# Patient Record
Sex: MALE
[De-identification: names, ages, dates, MRNs, and addresses within clinical notes are randomized per-mention and may not be internally consistent; named-entity substitution may affect disease eponyms.]

---

## 2019-04-10 PROBLEM — Z00.00 ENCOUNTER FOR PREVENTIVE HEALTH EXAMINATION: Status: ACTIVE | Noted: 2019-04-10

## 2019-04-11 ENCOUNTER — APPOINTMENT (OUTPATIENT)
Dept: CARDIOLOGY | Facility: CLINIC | Age: 54
End: 2019-04-11
Payer: COMMERCIAL

## 2019-04-11 VITALS
BODY MASS INDEX: 27.31 KG/M2 | RESPIRATION RATE: 15 BRPM | WEIGHT: 160 LBS | SYSTOLIC BLOOD PRESSURE: 135 MMHG | HEART RATE: 68 BPM | DIASTOLIC BLOOD PRESSURE: 91 MMHG | HEIGHT: 64 IN

## 2019-04-11 PROCEDURE — 99242 OFF/OP CONSLTJ NEW/EST SF 20: CPT

## 2019-08-02 ENCOUNTER — APPOINTMENT (OUTPATIENT)
Dept: CARDIOLOGY | Facility: CLINIC | Age: 54
End: 2019-08-02
Payer: COMMERCIAL

## 2019-08-02 VITALS
DIASTOLIC BLOOD PRESSURE: 83 MMHG | BODY MASS INDEX: 27.31 KG/M2 | HEIGHT: 64 IN | WEIGHT: 160 LBS | HEART RATE: 82 BPM | RESPIRATION RATE: 15 BRPM | SYSTOLIC BLOOD PRESSURE: 126 MMHG

## 2019-08-02 PROCEDURE — ZZZZZ: CPT

## 2019-08-02 PROCEDURE — 93306 TTE W/DOPPLER COMPLETE: CPT

## 2019-08-02 PROCEDURE — 99213 OFFICE O/P EST LOW 20 MIN: CPT | Mod: 25

## 2019-08-02 PROCEDURE — 93015 CV STRESS TEST SUPVJ I&R: CPT

## 2021-02-28 ENCOUNTER — TRANSCRIPTION ENCOUNTER (OUTPATIENT)
Age: 56
End: 2021-02-28

## 2022-08-04 ENCOUNTER — APPOINTMENT (OUTPATIENT)
Dept: CARDIOLOGY | Facility: CLINIC | Age: 57
End: 2022-08-04

## 2022-08-04 VITALS
WEIGHT: 156 LBS | RESPIRATION RATE: 15 BRPM | HEART RATE: 80 BPM | SYSTOLIC BLOOD PRESSURE: 156 MMHG | HEIGHT: 64 IN | BODY MASS INDEX: 26.63 KG/M2 | DIASTOLIC BLOOD PRESSURE: 90 MMHG

## 2022-08-04 PROCEDURE — 93000 ELECTROCARDIOGRAM COMPLETE: CPT

## 2022-08-04 PROCEDURE — 99203 OFFICE O/P NEW LOW 30 MIN: CPT | Mod: 25

## 2022-08-04 NOTE — REASON FOR VISIT
[Follow-Up - Clinic] : a clinic follow-up of [Dyspnea] : dyspnea [CV Risk Factors and Non-Cardiac Disease] : CV risk factors and non-cardiac disease [Hypertension] : hypertension [FreeTextEntry1] : murmur

## 2022-08-04 NOTE — DISCUSSION/SUMMARY
[FreeTextEntry1] : This is a 57-year-old male with past medical history significant for hypertension who comes in for cardiac follow up evaluation.  He denies chest pain, shortness of breath, dizziness or syncope.  He does get occasional dyspnea on exertion. \par The patient has not been seen in over 2 years.  He ran out of his Micardis hydrochlorothiazide 80/25 mg/day.  He has been taking his wife's dose of 80 mg of Micardis per day.  His blood pressure still elevated.\par Electrocardiogram done August 4, 2022 demonstrated normal sinus rhythm rate of 67 bpm is otherwise unremarkable.\par I will send out a new prescription to [facility] for Micardis hydrochlorothiazide 80/25 mg/day.\par He also understands he must limit his salt intake.  He will schedule an exercise stress test to rule out significant coronary artery disease.  I have also asked him to go for fasting blood test for lipid panel.\par We will schedule an echo Doppler examination to evaluate his left ventricular function, chamber size, murmur, and rule out hypertrophy.\par The patient had a normal exercise stress test August 2, 2019.\par  He has no history of rheumatic fever.  He does not drink excessive caffeine or alcohol.\par Electrocardiogram for review November 11, 2018 demonstrated normal sinus rhythm rate 68 beats per minute and is otherwise unremarkable.\par Blood work done in your office November 11, 2018 demonstrated potassium of 4.2, total cholesterol 194 mg/dL, HDL 44 mg/dL,  mg/dL.  His hemoglobin A1c was 5.6.\par \par Lifestyle modification including sore restriction regular aerobic activity were all encouraged.\par The patient understands that aerobic exercises must be increased to 40 minutes 4 times per week. A detailed discussion of lifestyle modification was done today. The patient has a good understanding of the diagnosis, and treatment plan. Lifestyle modification was also outlined.

## 2022-08-04 NOTE — PHYSICAL EXAM
[General Appearance - Well Developed] : well developed [Normal Appearance] : normal appearance [Well Groomed] : well groomed [General Appearance - Well Nourished] : well nourished [No Deformities] : no deformities [General Appearance - In No Acute Distress] : no acute distress [Normal Oral Mucosa] : normal oral mucosa [Normal Oropharynx] : normal oropharynx [Normal Jugular Venous A Waves Present] : normal jugular venous A waves present [Normal Jugular Venous V Waves Present] : normal jugular venous V waves present [No Jugular Venous Quesada A Waves] : no jugular venous quesada A waves [Respiration, Rhythm And Depth] : normal respiratory rhythm and effort [Exaggerated Use Of Accessory Muscles For Inspiration] : no accessory muscle use [Auscultation Breath Sounds / Voice Sounds] : lungs were clear to auscultation bilaterally [Bowel Sounds] : normal bowel sounds [Abdomen Soft] : soft [Abnormal Walk] : normal gait [Gait - Sufficient For Exercise Testing] : the gait was sufficient for exercise testing [Nail Clubbing] : no clubbing of the fingernails [Cyanosis, Localized] : no localized cyanosis [Skin Color & Pigmentation] : normal skin color and pigmentation [Skin Turgor] : normal skin turgor [] : no rash [Oriented To Time, Place, And Person] : oriented to person, place, and time [Affect] : the affect was normal [Mood] : the mood was normal [No Anxiety] : not feeling anxious [Normal] : normal [No Precordial Heave] : no precordial heave was noted [Normal Rate] : normal [Rhythm Regular] : regular [Normal S1] : normal S1 [Normal S2] : normal S2 [I] : a grade 1 [2+] : left 2+ [No Abnormalities] : the abdominal aorta was not enlarged and no bruit was heard [No Pitting Edema] : no pitting edema present [Well Developed] : well developed [Well Nourished] : well nourished [No Acute Distress] : no acute distress [Normal Conjunctiva] : normal conjunctiva [Normal Venous Pressure] : normal venous pressure [No Carotid Bruit] : no carotid bruit [Normal S1, S2] : normal S1, S2 [No Murmur] : no murmur [No Rub] : no rub [No Gallop] : no gallop [Murmur] : murmur [Clear Lung Fields] : clear lung fields [Good Air Entry] : good air entry [No Respiratory Distress] : no respiratory distress  [Soft] : abdomen soft [Non Tender] : non-tender [No Masses/organomegaly] : no masses/organomegaly [Normal Bowel Sounds] : normal bowel sounds [Normal Gait] : normal gait [No Edema] : no edema [No Cyanosis] : no cyanosis [No Clubbing] : no clubbing [No Varicosities] : no varicosities [No Rash] : no rash [No Skin Lesions] : no skin lesions [Moves all extremities] : moves all extremities [No Focal Deficits] : no focal deficits [Normal Speech] : normal speech [Alert and Oriented] : alert and oriented [Normal memory] : normal memory [de-identified] : grade 1 pansystolic murmur heard best in the apex  [S3] : no S3 [S4] : no S4 [Right Carotid Bruit] : no bruit heard over the right carotid [Left Carotid Bruit] : no bruit heard over the left carotid [Right Femoral Bruit] : no bruit heard over the right femoral artery [Left Femoral Bruit] : no bruit heard over the left femoral artery

## 2022-08-08 RX ORDER — TELMISARTAN AND HYDROCHLOROTHIAZIDE 80; 25 MG/1; MG/1
80-25 TABLET ORAL
Qty: 90 | Refills: 1 | Status: COMPLETED | COMMUNITY
Start: 2019-04-11 | End: 2022-08-08

## 2022-08-08 RX ORDER — UBIDECARENONE/VIT E ACET 100MG-5
100 CAPSULE ORAL
Refills: 0 | Status: ACTIVE | COMMUNITY
Start: 2022-08-08

## 2022-08-08 RX ORDER — OMEGA-3/DHA/EPA/FISH OIL 300-1000MG
1000 CAPSULE ORAL
Refills: 0 | Status: ACTIVE | COMMUNITY
Start: 2022-08-08

## 2022-08-08 RX ORDER — CHOLECALCIFEROL (VITAMIN D3) 50 MCG
50 MCG TABLET ORAL DAILY
Refills: 0 | Status: ACTIVE | COMMUNITY
Start: 2022-08-08

## 2023-01-16 ENCOUNTER — RX RENEWAL (OUTPATIENT)
Age: 58
End: 2023-01-16

## 2023-03-09 ENCOUNTER — APPOINTMENT (OUTPATIENT)
Dept: CARDIOLOGY | Facility: CLINIC | Age: 58
End: 2023-03-09

## 2023-04-11 ENCOUNTER — LABORATORY RESULT (OUTPATIENT)
Age: 58
End: 2023-04-11

## 2023-04-11 ENCOUNTER — APPOINTMENT (OUTPATIENT)
Dept: CARDIOLOGY | Facility: CLINIC | Age: 58
End: 2023-04-11
Payer: COMMERCIAL

## 2023-04-11 VITALS
HEIGHT: 64 IN | RESPIRATION RATE: 16 BRPM | HEART RATE: 79 BPM | WEIGHT: 155.2 LBS | DIASTOLIC BLOOD PRESSURE: 79 MMHG | OXYGEN SATURATION: 99 % | BODY MASS INDEX: 26.5 KG/M2 | TEMPERATURE: 98.1 F | SYSTOLIC BLOOD PRESSURE: 125 MMHG

## 2023-04-11 DIAGNOSIS — R06.09 OTHER FORMS OF DYSPNEA: ICD-10-CM

## 2023-04-11 PROCEDURE — 93015 CV STRESS TEST SUPVJ I&R: CPT

## 2023-04-11 PROCEDURE — 99213 OFFICE O/P EST LOW 20 MIN: CPT | Mod: 25

## 2023-04-11 PROCEDURE — 93306 TTE W/DOPPLER COMPLETE: CPT

## 2023-04-11 NOTE — REASON FOR VISIT
[CV Risk Factors and Non-Cardiac Disease] : CV risk factors and non-cardiac disease [Follow-Up - Clinic] : a clinic follow-up of [Dyspnea] : dyspnea [Hypertension] : hypertension [FreeTextEntry1] : murmur

## 2023-04-11 NOTE — PHYSICAL EXAM
[Well Developed] : well developed [Well Nourished] : well nourished [No Acute Distress] : no acute distress [Normal Venous Pressure] : normal venous pressure [No Carotid Bruit] : no carotid bruit [Normal S1, S2] : normal S1, S2 [No Murmur] : no murmur [No Rub] : no rub [Murmur] : murmur [Clear Lung Fields] : clear lung fields [Good Air Entry] : good air entry [No Respiratory Distress] : no respiratory distress  [Soft] : abdomen soft [Non Tender] : non-tender [No Masses/organomegaly] : no masses/organomegaly [Normal Bowel Sounds] : normal bowel sounds [Normal Gait] : normal gait [No Edema] : no edema [No Cyanosis] : no cyanosis [No Clubbing] : no clubbing [No Varicosities] : no varicosities [No Rash] : no rash [No Skin Lesions] : no skin lesions [Moves all extremities] : moves all extremities [No Focal Deficits] : no focal deficits [Normal Speech] : normal speech [Alert and Oriented] : alert and oriented [Normal memory] : normal memory [General Appearance - Well Developed] : well developed [Normal Appearance] : normal appearance [Well Groomed] : well groomed [General Appearance - Well Nourished] : well nourished [No Deformities] : no deformities [General Appearance - In No Acute Distress] : no acute distress [Normal Conjunctiva] : the conjunctiva exhibited no abnormalities [Normal Oral Mucosa] : normal oral mucosa [Normal Oropharynx] : normal oropharynx [Normal Jugular Venous A Waves Present] : normal jugular venous A waves present [Normal Jugular Venous V Waves Present] : normal jugular venous V waves present [No Jugular Venous Quesada A Waves] : no jugular venous quesada A waves [Respiration, Rhythm And Depth] : normal respiratory rhythm and effort [Exaggerated Use Of Accessory Muscles For Inspiration] : no accessory muscle use [Auscultation Breath Sounds / Voice Sounds] : lungs were clear to auscultation bilaterally [Bowel Sounds] : normal bowel sounds [Abdomen Soft] : soft [Abnormal Walk] : normal gait [Gait - Sufficient For Exercise Testing] : the gait was sufficient for exercise testing [Nail Clubbing] : no clubbing of the fingernails [Cyanosis, Localized] : no localized cyanosis [Skin Color & Pigmentation] : normal skin color and pigmentation [Skin Turgor] : normal skin turgor [] : no rash [Oriented To Time, Place, And Person] : oriented to person, place, and time [Affect] : the affect was normal [Mood] : the mood was normal [No Anxiety] : not feeling anxious [Normal] : normal [No Precordial Heave] : no precordial heave was noted [Normal Rate] : normal [Rhythm Regular] : regular [Normal S1] : normal S1 [Normal S2] : normal S2 [No Gallop] : no gallop heard [I] : a grade 1 [2+] : left 2+ [No Abnormalities] : the abdominal aorta was not enlarged and no bruit was heard [No Pitting Edema] : no pitting edema present [de-identified] : grade 1 pansystolic murmur heard best in the apex  [S3] : no S3 [S4] : no S4 [Right Carotid Bruit] : no bruit heard over the right carotid [Left Carotid Bruit] : no bruit heard over the left carotid [Right Femoral Bruit] : no bruit heard over the right femoral artery [Left Femoral Bruit] : no bruit heard over the left femoral artery

## 2023-04-11 NOTE — DISCUSSION/SUMMARY
[FreeTextEntry1] : This is a 57-year-old male with past medical history significant for hypertension who comes in for cardiac follow up evaluation.  He denies chest pain, shortness of breath, dizziness or syncope.  He does get occasional dyspnea on exertion. \par The patient had a normal exercise stress test April 11, 2023.\par He reports that his blood pressure was elevated at his primary care physician's office.  He is now taking Micardis hydrochlorothiazide 80/25 mg in the morning, and amlodipine 5 mg after dinner.  His blood pressure response to exercise was adequate.  He will follow-up with his primary care doctor in the next month to recheck his blood pressure.\par He will also go for blood work including electrolytes and lipid profile prior to his next visit.\par He will continue on these medications for now.  He is taking his blood pressure on a home monitor but is not averaging out multiple readings.  I have asked him to take it twice  by 1 minute, and average of those 2 numbers.\par He will follow-up with me in 6 weeks to recheck his blood pressure.  He understands he must limit his salt intake.\par The patient has not been seen in over 2 years.  He ran out of his Micardis hydrochlorothiazide 80/25 mg/day.  He has been taking his wife's dose of 80 mg of Micardis per day.  His blood pressure still elevated.\par Electrocardiogram done August 4, 2022 demonstrated normal sinus rhythm rate of 67 bpm is otherwise unremarkable.\par The patient had a normal exercise stress test August 2, 2019.\par Electrocardiogram for review November 11, 2018 demonstrated normal sinus rhythm rate 68 beats per minute and is otherwise unremarkable.\par Blood work done in your office November 11, 2018 demonstrated potassium of 4.2, total cholesterol 194 mg/dL, HDL 44 mg/dL,  mg/dL.  His hemoglobin A1c was 5.6.\par \par Lifestyle modification including sore restriction regular aerobic activity were all encouraged.\par The patient understands that aerobic exercises must be increased to 40 minutes 4 times per week. A detailed discussion of lifestyle modification was done today. The patient has a good understanding of the diagnosis, and treatment plan. Lifestyle modification was also outlined.

## 2023-04-13 ENCOUNTER — RX RENEWAL (OUTPATIENT)
Age: 58
End: 2023-04-13

## 2023-04-21 ENCOUNTER — NON-APPOINTMENT (OUTPATIENT)
Age: 58
End: 2023-04-21

## 2023-05-17 PROBLEM — R06.09 DOE (DYSPNEA ON EXERTION): Status: ACTIVE | Noted: 2019-08-02

## 2023-06-20 ENCOUNTER — APPOINTMENT (OUTPATIENT)
Dept: CARDIOLOGY | Facility: CLINIC | Age: 58
End: 2023-06-20
Payer: COMMERCIAL

## 2023-06-20 ENCOUNTER — NON-APPOINTMENT (OUTPATIENT)
Age: 58
End: 2023-06-20

## 2023-06-20 VITALS
HEART RATE: 64 BPM | SYSTOLIC BLOOD PRESSURE: 134 MMHG | OXYGEN SATURATION: 96 % | HEIGHT: 64 IN | DIASTOLIC BLOOD PRESSURE: 70 MMHG | BODY MASS INDEX: 26.29 KG/M2 | WEIGHT: 154 LBS | RESPIRATION RATE: 16 BRPM | TEMPERATURE: 98.4 F

## 2023-06-20 PROCEDURE — 93000 ELECTROCARDIOGRAM COMPLETE: CPT

## 2023-06-20 PROCEDURE — 99214 OFFICE O/P EST MOD 30 MIN: CPT | Mod: 25

## 2023-06-20 NOTE — PHYSICAL EXAM
[Well Developed] : well developed [Well Nourished] : well nourished [No Acute Distress] : no acute distress [Normal Venous Pressure] : normal venous pressure [No Carotid Bruit] : no carotid bruit [Normal S1, S2] : normal S1, S2 [No Murmur] : no murmur [No Rub] : no rub [Murmur] : murmur [Clear Lung Fields] : clear lung fields [Good Air Entry] : good air entry [No Respiratory Distress] : no respiratory distress  [Soft] : abdomen soft [Non Tender] : non-tender [No Masses/organomegaly] : no masses/organomegaly [Normal Bowel Sounds] : normal bowel sounds [Normal Gait] : normal gait [No Edema] : no edema [No Cyanosis] : no cyanosis [No Clubbing] : no clubbing [No Varicosities] : no varicosities [No Rash] : no rash [No Skin Lesions] : no skin lesions [Moves all extremities] : moves all extremities [No Focal Deficits] : no focal deficits [Normal Speech] : normal speech [Alert and Oriented] : alert and oriented [Normal memory] : normal memory [General Appearance - Well Developed] : well developed [Normal Appearance] : normal appearance [Well Groomed] : well groomed [General Appearance - Well Nourished] : well nourished [No Deformities] : no deformities [General Appearance - In No Acute Distress] : no acute distress [Normal Conjunctiva] : the conjunctiva exhibited no abnormalities [Normal Oral Mucosa] : normal oral mucosa [Normal Oropharynx] : normal oropharynx [Normal Jugular Venous A Waves Present] : normal jugular venous A waves present [Normal Jugular Venous V Waves Present] : normal jugular venous V waves present [No Jugular Venous Quesada A Waves] : no jugular venous quesada A waves [Respiration, Rhythm And Depth] : normal respiratory rhythm and effort [Exaggerated Use Of Accessory Muscles For Inspiration] : no accessory muscle use [Auscultation Breath Sounds / Voice Sounds] : lungs were clear to auscultation bilaterally [Bowel Sounds] : normal bowel sounds [Abdomen Soft] : soft [Abnormal Walk] : normal gait [Gait - Sufficient For Exercise Testing] : the gait was sufficient for exercise testing [Nail Clubbing] : no clubbing of the fingernails [Cyanosis, Localized] : no localized cyanosis [Skin Color & Pigmentation] : normal skin color and pigmentation [Skin Turgor] : normal skin turgor [] : no rash [Oriented To Time, Place, And Person] : oriented to person, place, and time [Affect] : the affect was normal [Mood] : the mood was normal [No Anxiety] : not feeling anxious [Normal] : normal [No Precordial Heave] : no precordial heave was noted [Normal Rate] : normal [Rhythm Regular] : regular [Normal S1] : normal S1 [Normal S2] : normal S2 [No Gallop] : no gallop heard [I] : a grade 1 [2+] : left 2+ [No Abnormalities] : the abdominal aorta was not enlarged and no bruit was heard [No Pitting Edema] : no pitting edema present [de-identified] : grade 1 pansystolic murmur heard best in the apex  [S3] : no S3 [S4] : no S4 [Right Carotid Bruit] : no bruit heard over the right carotid [Left Carotid Bruit] : no bruit heard over the left carotid [Right Femoral Bruit] : no bruit heard over the right femoral artery [Left Femoral Bruit] : no bruit heard over the left femoral artery

## 2023-06-20 NOTE — REVIEW OF SYSTEMS
[Dyspnea on exertion] : dyspnea during exertion [Negative] : Heme/Lymph [SOB] : no shortness of breath

## 2023-06-20 NOTE — ASSESSMENT
[FreeTextEntry1] : This is a 58-year-old male with past medical history significant for hypertension who comes in for cardiac follow up evaluation.  He denies chest pain, shortness of breath, dizziness or syncope.  He does get occasional dyspnea on exertion. \par \par CHIEF COMPLAINT:\par Today he is feeling generally well and does not have any complaints at this time. \par \par He is currently prescribed amlodipine 5 mg daily CoQ 10 100 mg 1 tablet daily, fish oil 1000 mg 2 capsules twice daily and, telmisartan hydrochlorothiazide 80-25 mg daily\par \par He denies fever, chills, weight loss, malaise, rash, alteration bowel habits, weakness, abdominal  pain, bloating, changes in urination, visual disturbances, chest pain, headaches, dizziness, heart palpitations, recent episodes of syncope or falls at this time.\par \par BLOOD PRESSURE:\par -BP is controlled in today's visit.\par \par BLOOD WORK:\par -New blood work was done April 17, 2023 demonstrated total cholesterol 233.\par \par TESTING/REPORTS:\par -EKG done Jun 20, 2023 which demonstrated regular sinus rhythm with nonspecific ST-T wave changes, BPM of \par \par -The patient had a normal exercise stress test April 11, 2023.\par \par -Echocardiogram done April 11, 2023 demonstrated normal left ventricular systolic function ejection fraction 65%.\par \par -Electrocardiogram done August 4, 2022 demonstrated normal sinus rhythm rate of 67 bpm is otherwise unremarkable.\par \par -The patient had a normal exercise stress test August 2, 2019.\par \par -Electrocardiogram for review November 11, 2018 demonstrated normal sinus rhythm rate 68 beats per minute and is otherwise unremarkable.\par \par PLAN:\par -The patient is clinically stable from a cardiac standpoint on today's exam.\par -He will continue with his usual medications and will contact the office if he is having any complaints between now and their next follow up appointment.\par \par I have discussed the plan of care with Mr. ADRIENNE CARROLL  and he  will follow up in 4-6 months. He is compliant with all of his medications.\par The patient understands that aerobic exercises must be increased to at least 20 minutes 4 times/week along with maintaining lifestyle modifications including well-maintained diet. He will contact me at the office for any questions with their care or any changes in their health status.\par \anthony Stevens NP

## 2023-12-07 ENCOUNTER — APPOINTMENT (OUTPATIENT)
Dept: CARDIOLOGY | Facility: CLINIC | Age: 58
End: 2023-12-07
Payer: COMMERCIAL

## 2023-12-07 ENCOUNTER — NON-APPOINTMENT (OUTPATIENT)
Age: 58
End: 2023-12-07

## 2023-12-07 VITALS
BODY MASS INDEX: 26.12 KG/M2 | SYSTOLIC BLOOD PRESSURE: 122 MMHG | DIASTOLIC BLOOD PRESSURE: 74 MMHG | RESPIRATION RATE: 16 BRPM | WEIGHT: 153 LBS | OXYGEN SATURATION: 97 % | TEMPERATURE: 97.8 F | HEART RATE: 62 BPM | HEIGHT: 64 IN

## 2023-12-07 PROCEDURE — 93000 ELECTROCARDIOGRAM COMPLETE: CPT

## 2023-12-07 PROCEDURE — 99214 OFFICE O/P EST MOD 30 MIN: CPT | Mod: 25

## 2024-05-15 ENCOUNTER — NON-APPOINTMENT (OUTPATIENT)
Age: 59
End: 2024-05-15

## 2024-05-15 ENCOUNTER — APPOINTMENT (OUTPATIENT)
Dept: CARDIOLOGY | Facility: CLINIC | Age: 59
End: 2024-05-15
Payer: COMMERCIAL

## 2024-05-15 VITALS
OXYGEN SATURATION: 98 % | SYSTOLIC BLOOD PRESSURE: 122 MMHG | BODY MASS INDEX: 26.12 KG/M2 | DIASTOLIC BLOOD PRESSURE: 76 MMHG | RESPIRATION RATE: 16 BRPM | WEIGHT: 153 LBS | HEART RATE: 70 BPM | HEIGHT: 64 IN | TEMPERATURE: 97.9 F

## 2024-05-15 DIAGNOSIS — E78.2 MIXED HYPERLIPIDEMIA: ICD-10-CM

## 2024-05-15 DIAGNOSIS — I10 ESSENTIAL (PRIMARY) HYPERTENSION: ICD-10-CM

## 2024-05-15 DIAGNOSIS — R01.1 CARDIAC MURMUR, UNSPECIFIED: ICD-10-CM

## 2024-05-15 PROCEDURE — 99214 OFFICE O/P EST MOD 30 MIN: CPT

## 2024-05-15 PROCEDURE — 93000 ELECTROCARDIOGRAM COMPLETE: CPT

## 2024-05-15 PROCEDURE — G2211 COMPLEX E/M VISIT ADD ON: CPT

## 2024-05-15 RX ORDER — AMLODIPINE BESYLATE 5 MG/1
5 TABLET ORAL
Qty: 90 | Refills: 1 | Status: ACTIVE | COMMUNITY
Start: 2023-04-13 | End: 1900-01-01

## 2024-05-15 RX ORDER — TELMISARTAN AND HYDROCHLOROTHIAZIDE 80; 25 MG/1; MG/1
80-25 TABLET ORAL DAILY
Qty: 90 | Refills: 1 | Status: ACTIVE | COMMUNITY
Start: 2022-08-04 | End: 1900-01-01

## 2024-05-15 NOTE — DISCUSSION/SUMMARY
[FreeTextEntry1] : Dr. Nielson-(PRIOR VISIT and PMH WITH Dr. Nielson): This is a 57-year-old male with past medical history significant for hypertension who comes in for cardiac follow up evaluation.  He denies chest pain, shortness of breath, dizziness or syncope.  He does get occasional dyspnea on exertion.  The patient had a normal exercise stress test April 11, 2023. He reports that his blood pressure was elevated at his primary care physician's office.  He is now taking Micardis hydrochlorothiazide 80/25 mg in the morning, and amlodipine 5 mg after dinner.  His blood pressure response to exercise was adequate.  He will follow-up with his primary care doctor in the next month to recheck his blood pressure. He will also go for blood work including electrolytes and lipid profile prior to his next visit. He will continue on these medications for now.  He is taking his blood pressure on a home monitor but is not averaging out multiple readings.  I have asked him to take it twice  by 1 minute, and average of those 2 numbers. He will follow-up with me in 6 weeks to recheck his blood pressure.  He understands he must limit his salt intake. The patient has not been seen in over 2 years.  He ran out of his Micardis hydrochlorothiazide 80/25 mg/day.  He has been taking his wife's dose of 80 mg of Micardis per day.  His blood pressure still elevated. Electrocardiogram done August 4, 2022 demonstrated normal sinus rhythm rate of 67 bpm is otherwise unremarkable. The patient had a normal exercise stress test August 2, 2019. Electrocardiogram for review November 11, 2018 demonstrated normal sinus rhythm rate 68 beats per minute and is otherwise unremarkable. Blood work done in your office November 11, 2018 demonstrated potassium of 4.2, total cholesterol 194 mg/dL, HDL 44 mg/dL,  mg/dL.  His hemoglobin A1c was 5.6.  Lifestyle modification including sore restriction regular aerobic activity were all encouraged. The patient understands that aerobic exercises must be increased to 40 minutes 4 times per week. A detailed discussion of lifestyle modification was done today. The patient has a good understanding of the diagnosis, and treatment plan. Lifestyle modification was also outlined.

## 2024-05-15 NOTE — REASON FOR VISIT
[CV Risk Factors and Non-Cardiac Disease] : CV risk factors and non-cardiac disease [FreeTextEntry3] : Galilea BROWN [Follow-Up - Clinic] : a clinic follow-up of [Dyspnea] : dyspnea [Hypertension] : hypertension [FreeTextEntry1] : murmur

## 2024-05-15 NOTE — ASSESSMENT
[FreeTextEntry1] : This is a 58-year-old male with past medical history significant for hypertension who comes in for cardiac follow up evaluation.   HPI: He is feeling generally well today and denies chest pain, dizziness, heart palpitations, recent episodes of syncope or falls, SOB, or dyspnea at this time.  Current Medications: Amlodipine 5 mg daily, CoQ 10 100 mg daily, Fish oil 1000 mg 2 capsules BID and, Telmisartan-Hydrochlorothiazide 80-25 mg daily.   He has started to eat better and get in more exercise in order to reduce his total cholesterol and LDL cholesterol levels. Repeat blood work done November 2023 demonstrated decreases in both of these values from prior results in April 2023.    BLOOD PRESSURE: -BP is controlled in today's visit.   BLOOD WORK: -New blood work to be done by his PCP next month and will be faxed over for our records.  -Blood work done November 2023 demonstrated total cholesterol 205, triglycerides 96, HDL 48, , non- -New blood work done April 17, 2023 demonstrated total cholesterol 233, triglyceride 123, HDL 51, , non-, LDL direct 154.    TESTING/REPORTS: -EKG done 05/15/2024 demonstrated regular sinus rhythm heart rate 62 bpm otherwise unremarkable.   -EKG done 12/07/2023 demonstrated regular sinus rhythm heart rate 62 bpm otherwise unremarkable.   -EKG done Jun 20, 2023 which demonstrated regular sinus rhythm with nonspecific ST-T wave changes, BPM of 64.  -The patient had a normal exercise stress test April 11, 2023.  -Echocardiogram done April 11, 2023 demonstrated normal left ventricular systolic function ejection fraction 65%.  -Electrocardiogram done August 4, 2022 demonstrated normal sinus rhythm rate of 67 bpm is otherwise unremarkable.  -The patient had a normal exercise stress test August 2, 2019.  -Electrocardiogram for review November 11, 2018 demonstrated normal sinus rhythm rate 68 beats per minute and is otherwise unremarkable.   PLAN: -The patient is clinically stable from a cardiac standpoint on today's exam. -He will continue with his usual medications and will contact the office if he is having any complaints between now and their next follow up appointment.  I have discussed the plan of care with Mr. ADRIENNE CARROLL and he will follow up in 4-6 months. He is compliant with all of his medications.  The patient understands that aerobic exercises must be increased to at least 20 minutes 4 times/week along with maintaining lifestyle modifications including well-maintained diet. He will contact me at the office for any questions with their care or any changes in their health status.  Dr Nielson, supervising physician, was present in the office with KAILYN Jones NP during the martins portions of the history and exam. The plan was discussed in detail as documented in the note.   KAILYN Jones NP

## 2024-05-15 NOTE — PHYSICAL EXAM
[Well Developed] : well developed [Well Nourished] : well nourished [No Acute Distress] : no acute distress [Normal Venous Pressure] : normal venous pressure [No Carotid Bruit] : no carotid bruit [Normal S1, S2] : normal S1, S2 [No Rub] : no rub [Murmur] : murmur [5th Left ICS - MCL] : palpated at the 5th LICS in the midclavicular line [Apical Thrill] : no thrill palpable at the apex [Clear Lung Fields] : clear lung fields [Good Air Entry] : good air entry [No Respiratory Distress] : no respiratory distress  [Soft] : abdomen soft [Non Tender] : non-tender [No Masses/organomegaly] : no masses/organomegaly [Normal Bowel Sounds] : normal bowel sounds [Normal Gait] : normal gait [No Edema] : no edema [No Cyanosis] : no cyanosis [No Clubbing] : no clubbing [No Varicosities] : no varicosities [No Rash] : no rash [No Skin Lesions] : no skin lesions [Moves all extremities] : moves all extremities [No Focal Deficits] : no focal deficits [Normal Speech] : normal speech [Alert and Oriented] : alert and oriented [Normal memory] : normal memory [de-identified] : grade 1 pansystolic murmur heard best in the apex  [General Appearance - Well Developed] : well developed [Normal Appearance] : normal appearance [Well Groomed] : well groomed [General Appearance - Well Nourished] : well nourished [No Deformities] : no deformities [General Appearance - In No Acute Distress] : no acute distress [Normal Conjunctiva] : the conjunctiva exhibited no abnormalities [Normal Oral Mucosa] : normal oral mucosa [Normal Oropharynx] : normal oropharynx [Normal Jugular Venous A Waves Present] : normal jugular venous A waves present [Normal Jugular Venous V Waves Present] : normal jugular venous V waves present [No Jugular Venous Quesada A Waves] : no jugular venous quesada A waves [Respiration, Rhythm And Depth] : normal respiratory rhythm and effort [Exaggerated Use Of Accessory Muscles For Inspiration] : no accessory muscle use [Auscultation Breath Sounds / Voice Sounds] : lungs were clear to auscultation bilaterally [Bowel Sounds] : normal bowel sounds [Abdomen Soft] : soft [Abnormal Walk] : normal gait [Gait - Sufficient For Exercise Testing] : the gait was sufficient for exercise testing [Nail Clubbing] : no clubbing of the fingernails [Cyanosis, Localized] : no localized cyanosis [Skin Color & Pigmentation] : normal skin color and pigmentation [Skin Turgor] : normal skin turgor [] : no rash [Oriented To Time, Place, And Person] : oriented to person, place, and time [Affect] : the affect was normal [Mood] : the mood was normal [No Anxiety] : not feeling anxious [Normal] : normal [No Precordial Heave] : no precordial heave was noted [Normal Rate] : normal [Rhythm Regular] : regular [Normal S1] : normal S1 [Normal S2] : normal S2 [No Gallop] : no gallop heard [S3] : no S3 [S4] : no S4 [I] : a grade 1 [Right Carotid Bruit] : no bruit heard over the right carotid [Left Carotid Bruit] : no bruit heard over the left carotid [Right Femoral Bruit] : no bruit heard over the right femoral artery [Left Femoral Bruit] : no bruit heard over the left femoral artery [2+] : left 2+ [No Abnormalities] : the abdominal aorta was not enlarged and no bruit was heard [No Pitting Edema] : no pitting edema present

## 2024-11-13 ENCOUNTER — APPOINTMENT (OUTPATIENT)
Dept: CARDIOLOGY | Facility: CLINIC | Age: 59
End: 2024-11-13

## 2025-01-06 ENCOUNTER — RX RENEWAL (OUTPATIENT)
Age: 60
End: 2025-01-06

## 2025-05-13 ENCOUNTER — NON-APPOINTMENT (OUTPATIENT)
Age: 60
End: 2025-05-13

## 2025-05-13 ENCOUNTER — APPOINTMENT (OUTPATIENT)
Dept: CARDIOLOGY | Facility: CLINIC | Age: 60
End: 2025-05-13
Payer: COMMERCIAL

## 2025-05-13 VITALS
WEIGHT: 158 LBS | SYSTOLIC BLOOD PRESSURE: 126 MMHG | TEMPERATURE: 97.6 F | BODY MASS INDEX: 26.98 KG/M2 | HEIGHT: 64 IN | OXYGEN SATURATION: 97 % | HEART RATE: 61 BPM | DIASTOLIC BLOOD PRESSURE: 74 MMHG

## 2025-05-13 DIAGNOSIS — E78.2 MIXED HYPERLIPIDEMIA: ICD-10-CM

## 2025-05-13 DIAGNOSIS — R01.1 CARDIAC MURMUR, UNSPECIFIED: ICD-10-CM

## 2025-05-13 DIAGNOSIS — I10 ESSENTIAL (PRIMARY) HYPERTENSION: ICD-10-CM

## 2025-05-13 PROCEDURE — 99213 OFFICE O/P EST LOW 20 MIN: CPT

## 2025-05-13 PROCEDURE — G2211 COMPLEX E/M VISIT ADD ON: CPT | Mod: NC

## 2025-05-13 PROCEDURE — 93000 ELECTROCARDIOGRAM COMPLETE: CPT

## 2025-05-13 RX ORDER — ROSUVASTATIN CALCIUM 20 MG/1
20 TABLET, FILM COATED ORAL
Qty: 90 | Refills: 1 | Status: ACTIVE | COMMUNITY
Start: 2025-05-13 | End: 1900-01-01

## 2025-09-03 DIAGNOSIS — R06.09 OTHER FORMS OF DYSPNEA: ICD-10-CM
